# Patient Record
Sex: MALE | Race: WHITE | NOT HISPANIC OR LATINO | URBAN - METROPOLITAN AREA
[De-identification: names, ages, dates, MRNs, and addresses within clinical notes are randomized per-mention and may not be internally consistent; named-entity substitution may affect disease eponyms.]

---

## 2023-05-11 DIAGNOSIS — F41.9 ANXIETY AND DEPRESSION: Primary | ICD-10-CM

## 2023-05-11 DIAGNOSIS — F32.A ANXIETY AND DEPRESSION: Primary | ICD-10-CM

## 2023-05-11 RX ORDER — ESCITALOPRAM OXALATE 20 MG/1
1 TABLET ORAL DAILY
COMMUNITY
Start: 2021-01-13 | End: 2023-05-11 | Stop reason: SDUPTHER

## 2023-05-11 RX ORDER — ESCITALOPRAM OXALATE 20 MG/1
20 TABLET ORAL DAILY
Qty: 90 TABLET | Refills: 3 | Status: SHIPPED | OUTPATIENT
Start: 2023-05-11 | End: 2024-05-14 | Stop reason: SDUPTHER

## 2024-05-14 ENCOUNTER — TELEPHONE (OUTPATIENT)
Dept: PRIMARY CARE | Facility: CLINIC | Age: 44
End: 2024-05-14

## 2024-05-14 DIAGNOSIS — F41.9 ANXIETY AND DEPRESSION: ICD-10-CM

## 2024-05-14 DIAGNOSIS — F32.A ANXIETY AND DEPRESSION: ICD-10-CM

## 2024-05-14 RX ORDER — ESCITALOPRAM OXALATE 20 MG/1
20 TABLET ORAL DAILY
Qty: 90 TABLET | Refills: 0 | Status: SHIPPED | OUTPATIENT
Start: 2024-05-14 | End: 2025-05-14

## 2024-05-14 NOTE — TELEPHONE ENCOUNTER
Dev only has 1 pill left of his Lexapro, he was last seen 3 years ago but it looks like you refilled his med in may of last year.     I notified him that he would be a new patient. Are you able to refill his med until he finds a new pcp

## 2024-09-04 ENCOUNTER — APPOINTMENT (OUTPATIENT)
Dept: PRIMARY CARE | Facility: CLINIC | Age: 44
End: 2024-09-04
Payer: OTHER GOVERNMENT

## 2024-09-04 VITALS
HEART RATE: 83 BPM | SYSTOLIC BLOOD PRESSURE: 180 MMHG | BODY MASS INDEX: 34.56 KG/M2 | HEIGHT: 70 IN | OXYGEN SATURATION: 98 % | DIASTOLIC BLOOD PRESSURE: 100 MMHG | WEIGHT: 241.4 LBS

## 2024-09-04 DIAGNOSIS — F32.A ANXIETY AND DEPRESSION: ICD-10-CM

## 2024-09-04 DIAGNOSIS — Z76.89 ENCOUNTER TO ESTABLISH CARE: ICD-10-CM

## 2024-09-04 DIAGNOSIS — R03.0 WHITE COAT SYNDROME WITHOUT DIAGNOSIS OF HYPERTENSION: ICD-10-CM

## 2024-09-04 DIAGNOSIS — F41.9 ANXIETY: Primary | ICD-10-CM

## 2024-09-04 DIAGNOSIS — F41.9 ANXIETY AND DEPRESSION: ICD-10-CM

## 2024-09-04 PROBLEM — F41.1 ANXIETY, GENERALIZED: Status: ACTIVE | Noted: 2024-09-04

## 2024-09-04 PROCEDURE — 93000 ELECTROCARDIOGRAM COMPLETE: CPT | Performed by: INTERNAL MEDICINE

## 2024-09-04 PROCEDURE — 3080F DIAST BP >= 90 MM HG: CPT | Performed by: INTERNAL MEDICINE

## 2024-09-04 PROCEDURE — 99203 OFFICE O/P NEW LOW 30 MIN: CPT | Performed by: INTERNAL MEDICINE

## 2024-09-04 PROCEDURE — 3008F BODY MASS INDEX DOCD: CPT | Performed by: INTERNAL MEDICINE

## 2024-09-04 PROCEDURE — 1036F TOBACCO NON-USER: CPT | Performed by: INTERNAL MEDICINE

## 2024-09-04 PROCEDURE — 3077F SYST BP >= 140 MM HG: CPT | Performed by: INTERNAL MEDICINE

## 2024-09-04 RX ORDER — ESCITALOPRAM OXALATE 20 MG/1
20 TABLET ORAL DAILY
Qty: 90 TABLET | Refills: 3 | Status: SHIPPED | OUTPATIENT
Start: 2024-09-04 | End: 2025-09-04

## 2024-09-04 ASSESSMENT — PATIENT HEALTH QUESTIONNAIRE - PHQ9
SUM OF ALL RESPONSES TO PHQ9 QUESTIONS 1 AND 2: 0
1. LITTLE INTEREST OR PLEASURE IN DOING THINGS: NOT AT ALL
2. FEELING DOWN, DEPRESSED OR HOPELESS: NOT AT ALL

## 2024-09-04 NOTE — PROGRESS NOTES
"Subjective   Patient ID: Dev Sorto is a 44 y.o. male who presents for Establish Care (Medication refills. ).    HPI   To establish care and need refill  On lexapro for 4 yrs for anxiety.certain things cause worsening.Mostly controlled.No panic attacks  Sleep ok  Had sleep study  which was fine.  Started while in   Has  disability  Wife is also in   Works at mortgage [employer]      BP is always normal at home  No depression  Review of Systems  GENERAL;:Gained weight,no fatigue,fever,chills or sweats  HEENT:Denies headache,sorethroat,sinus drainage ,vision or hearing issues  CVS:No chest pain,sob or palpitation.No leg swelling  RESP:No c/c cough,cp,sob or wheezing  GI:NO abdominal pain,nausea,heartburn,vomiting,or bowel changes.  :No painful urination,frequency or hematuria.No incontinence  MSK:No joint pain or swelling  NEURO:No dizziness,weakness,numbness or tingling.No memory issues    Objective   BP (!) 180/100   Pulse 83   Ht 1.79 m (5' 10.47\")   Wt 109 kg (241 lb 6.4 oz)   SpO2 98%   BMI 34.18 kg/m²     Physical Exam  In general, well-appearing, not in acute distress, alert and oriented.  HEENT: No pallor or icterus  Neck: No lymphadenopathy, no stiffness.  Supple.  .No JVD  Chest: Clear to auscultation, good air entry.  CVS: S1 and S2 regular.  No murmur, no gallop, S3 or S4.  No peripheral edema.  No carotid bruit.  Abdomen: Soft, no tenderness, no hepatosplenomegaly.  Extremities: No calf tenderness.  No peripheral edema.   Neuro: No focal deficits.  Psych: Mood and affect normal.  Good judgment and insight   Assessment/Plan   Problem List Items Addressed This Visit             ICD-10-CM    White coat syndrome without diagnosis of hypertension R03.0    Relevant Orders    CBC and Auto Differential    Comprehensive Metabolic Panel    Lipid Panel    ECG 12 Lead (Completed)    Anxiety - Primary F41.9    Relevant Orders    CBC and Auto Differential    Comprehensive " Metabolic Panel    Encounter to establish care Z76.89     Other Visit Diagnoses         Codes    Anxiety and depression     F41.9, F32.A    Relevant Medications    escitalopram (Lexapro) 20 mg tablet          Patient has white coat hypertension for a long time.  He monitors BP at home which are all normal.  Recommended to send me a [employer] message with some more numbers.  Reviewed photo of BP this morning which was 136/78  DASH diet discussed.  Avoid processed meats and snacks which are high in sodium  Call me back if blood pressure is high at home  Go to ER for any chest pain or shortness of breath or severe headache  EKG did not show any LVH    Check labs including kidney function and lipids    Anxiety is controlled.  Need to continue medication.not depressed  Up-to-date with vaccines  Colonoscopy at 46  Follow-up if BP is higher than 130/80 at home and follow-up in 6 months

## 2025-08-22 ENCOUNTER — OFFICE VISIT (OUTPATIENT)
Dept: URGENT CARE | Age: 45
End: 2025-08-22
Payer: OTHER GOVERNMENT

## 2025-08-22 ENCOUNTER — HOSPITAL ENCOUNTER (OUTPATIENT)
Dept: RADIOLOGY | Facility: CLINIC | Age: 45
Discharge: HOME | End: 2025-08-22
Payer: OTHER GOVERNMENT

## 2025-08-22 VITALS
SYSTOLIC BLOOD PRESSURE: 192 MMHG | OXYGEN SATURATION: 98 % | HEART RATE: 84 BPM | TEMPERATURE: 98.6 F | DIASTOLIC BLOOD PRESSURE: 97 MMHG | RESPIRATION RATE: 16 BRPM

## 2025-08-22 DIAGNOSIS — R10.9 RIGHT FLANK PAIN: Primary | ICD-10-CM

## 2025-08-22 DIAGNOSIS — R10.31 RIGHT LOWER QUADRANT ABDOMINAL PAIN: ICD-10-CM

## 2025-08-22 DIAGNOSIS — R10.9 RIGHT FLANK PAIN: ICD-10-CM

## 2025-08-22 LAB
POC APPEARANCE, URINE: ABNORMAL
POC BILIRUBIN, URINE: NEGATIVE
POC BLOOD, URINE: ABNORMAL
POC COLOR, URINE: ABNORMAL
POC GLUCOSE, URINE: NEGATIVE MG/DL
POC KETONES, URINE: NEGATIVE MG/DL
POC LEUKOCYTES, URINE: NEGATIVE
POC NITRITE,URINE: NEGATIVE
POC PH, URINE: 6 PH
POC PROTEIN, URINE: ABNORMAL MG/DL
POC SPECIFIC GRAVITY, URINE: >=1.03
POC UROBILINOGEN, URINE: 0.2 EU/DL

## 2025-08-22 PROCEDURE — 99214 OFFICE O/P EST MOD 30 MIN: CPT | Performed by: STUDENT IN AN ORGANIZED HEALTH CARE EDUCATION/TRAINING PROGRAM

## 2025-08-22 PROCEDURE — 81003 URINALYSIS AUTO W/O SCOPE: CPT | Performed by: STUDENT IN AN ORGANIZED HEALTH CARE EDUCATION/TRAINING PROGRAM

## 2025-08-22 PROCEDURE — 3077F SYST BP >= 140 MM HG: CPT | Performed by: STUDENT IN AN ORGANIZED HEALTH CARE EDUCATION/TRAINING PROGRAM

## 2025-08-22 PROCEDURE — 1036F TOBACCO NON-USER: CPT | Performed by: STUDENT IN AN ORGANIZED HEALTH CARE EDUCATION/TRAINING PROGRAM

## 2025-08-22 PROCEDURE — 3080F DIAST BP >= 90 MM HG: CPT | Performed by: STUDENT IN AN ORGANIZED HEALTH CARE EDUCATION/TRAINING PROGRAM

## 2025-08-22 PROCEDURE — 74018 RADEX ABDOMEN 1 VIEW: CPT

## 2025-08-22 RX ORDER — TAMSULOSIN HYDROCHLORIDE 0.4 MG/1
0.4 CAPSULE ORAL DAILY
Qty: 7 CAPSULE | Refills: 0 | Status: SHIPPED | OUTPATIENT
Start: 2025-08-22 | End: 2025-08-29

## 2025-08-22 ASSESSMENT — PAIN SCALES - GENERAL: PAINLEVEL_OUTOF10: 6

## 2025-08-22 ASSESSMENT — ENCOUNTER SYMPTOMS
VOMITING: 0
ABDOMINAL PAIN: 1
NAUSEA: 1
RESPIRATORY NEGATIVE: 1
DIARRHEA: 0
CARDIOVASCULAR NEGATIVE: 1